# Patient Record
Sex: MALE | Race: BLACK OR AFRICAN AMERICAN | Employment: UNEMPLOYED | URBAN - METROPOLITAN AREA
[De-identification: names, ages, dates, MRNs, and addresses within clinical notes are randomized per-mention and may not be internally consistent; named-entity substitution may affect disease eponyms.]

---

## 2020-08-19 NOTE — PROGRESS NOTES
"[facility] Initial Evaluation  Subjective:  The history is provided by the patient. No  was used.   Therapist Generated HPI Evaluation  Problem details: Pt reports has had knee pain \"for a long time.\"  Wonders if is d/t a knee injury nearly 20 years ago while playing soccer that \"healed by itself.\"  Pt states did not really have formal evaluation or treatment at the time.  Referred to PT 08/19/2020.         Type of problem:  Right knee.    This is a new condition.  Condition occurred with:  Other reason.    Patient reports pain:  Posterior, lateral and anterior.        Exacerbated by: walking, squatting, bending and straightening the knee, stairs, carrying something heavy.  Relieved by: not moving the knee.          Patient Health History  Ace Marroquin being seen for right knee.       Problem occurred: when I was playing soccer   Pain is reported as 7/10 on pain scale.  General health as reported by patient is excellent.  Pertinent medical history includes: none.   Red flags:  None as reported by patient.  Medical allergies: none.   Surgeries include:  None.    Current medications:  None.    Current occupation is N/A.                                       Objective:  Standing Alignment:              Knee:  Normal      Gait:  Pt ambulates without device without gross asymmetry.                                                     Hip Evaluation  Hip PROM:  Hip PROM:  Left Hip:    Normal  Right Hip:  Normal                          Hip Strength:      Extension:  Left: 4/5  -  Pain:Right: 4/5    -  Pain:    Abduction:  Left: 4/5    -   Pain:Right: 4/5   -   Pain:                           Knee Evaluation:  ROM:    AROM    Hyperextension:  Left:  4    Right: 2 positive  Extension:  Left: 0    Right:  0  Flexion: Left: 144    Right: 132 positive  PROM        Flexion: Left:   Right:  135 empty end feel      Strength:     Extension:  Left: 5/5    Pain:-      Right: 4/5    " Pain:+  Flexion:  Left: 5/5    Pain:-      Right: 4/5    Pain:+    Quad Set Left:  Good    Pain: -   Quad Set Right:  Good    Pain: +  Ligament Testing:    Varus 0:  Left:  Neg   Right:  Neg  Varus 30:  Left:  Neg  Right:  Neg  Valgus 0:  Left:  Neg  Right:  Neg  Valgus 30:  Left:  Neg    Right:  Neg    Posterior Drawer: Left:  Neg  Right:  Neg  Lachman's:  Left:  Neg  Right:  Neg  Special Tests:     Left knee negative for the following special tests:  Rotary Instability-Anterior Medial; Rotary Instability-Anterior Lateral; Rotary Instability-Posterior Medial; Rotary Instability-Posterior Lateral; Patellar Tracking-Abduction Medial and Patellar Tracking-Abduction Lateral    Right knee negative for the following special tests:  Rotary Instability-Anterior Medial; Rotary Instability-Anterior Lateral; Rotary Instability-Posterior Medial; Rotary Instability-Posterior Lateral; Patellar Tracking-Abduction Medial and Patellar Tracking-Abduction Lateral  Palpation:  Normal      Edema:    Circumference:      Joint Line:  Left:  35.8 cm   Right:  36.3 cm    Mobility Testing:  Normal              Valgus deviation with SLS squat B.  Pt noted no change in symptoms on stairs after both repeated knee flexion and repeated knee extension.    General     ROS    Assessment/Plan:    Patient is a 45 year old male with right side knee complaints.    Patient has the following significant findings with corresponding treatment plan.                Diagnosis 1:  Chronic knee pain  Pain -  hot/cold therapy  Decreased ROM/flexibility - manual therapy and therapeutic exercise  Decreased strength - therapeutic exercise and therapeutic activities  Decreased function - therapeutic activities    Therapy Evaluation Codes:   1) History comprised of:   Personal factors that impact the plan of care:      Time since onset of symptoms.    Comorbidity factors that impact the plan of care are:      None.     Medications impacting care:  None.  2) Examination of Body Systems comprised of:   Body structures and functions that impact the plan of care:      Hip and Knee.   Activity limitations that impact the plan of care are:      Lifting, Squatting/kneeling, Stairs and Walking.  3) Clinical presentation characteristics are:   Stable/Uncomplicated.  4) Decision-Making    Low complexity using standardized patient assessment instrument and/or measureable assessment of functional outcome.  Cumulative Therapy Evaluation is: Low complexity.    Previous and current functional limitations:  (See Goal Flow Sheet for this information)    Short term and Long term goals: (See Goal Flow Sheet for this information)     Communication ability:  Patient appears to be able to clearly communicate and understand verbal and written communication and follow directions correctly.  Treatment Explanation - The following has been discussed with the patient:   RX ordered/plan of care  Anticipated outcomes  Possible risks and side effects  This patient would benefit from PT intervention to resume normal activities.   Rehab potential is good.    Frequency:  1 X week, once daily  Duration:  for 6 weeks  Discharge Plan:  Achieve all LTG.  Independent in home treatment program.  Reach maximal therapeutic benefit.    Please refer to the daily flowsheet for treatment today, total treatment time and time spent performing 1:1 timed codes.

## 2020-08-20 ENCOUNTER — THERAPY VISIT (OUTPATIENT)
Dept: PHYSICAL THERAPY | Facility: CLINIC | Age: 45
End: 2020-08-20
Payer: COMMERCIAL

## 2020-08-20 DIAGNOSIS — M25.561 CHRONIC PAIN OF RIGHT KNEE: ICD-10-CM

## 2020-08-20 DIAGNOSIS — G89.29 CHRONIC PAIN OF RIGHT KNEE: ICD-10-CM

## 2020-08-20 PROCEDURE — 97110 THERAPEUTIC EXERCISES: CPT | Mod: GP | Performed by: PHYSICAL THERAPIST

## 2020-08-20 PROCEDURE — 97161 PT EVAL LOW COMPLEX 20 MIN: CPT | Mod: GP | Performed by: PHYSICAL THERAPIST

## 2020-08-20 ASSESSMENT — ACTIVITIES OF DAILY LIVING (ADL)
GO UP STAIRS: ACTIVITY IS SOMEWHAT DIFFICULT
GO DOWN STAIRS: ACTIVITY IS MINIMALLY DIFFICULT
STAND: ACTIVITY IS NOT DIFFICULT
HOW_WOULD_YOU_RATE_THE_OVERALL_FUNCTION_OF_YOUR_KNEE_DURING_YOUR_USUAL_DAILY_ACTIVITIES?: NEARLY NORMAL
LIMPING: I DO NOT HAVE THE SYMPTOM
AS_A_RESULT_OF_YOUR_KNEE_INJURY,_HOW_WOULD_YOU_RATE_YOUR_CURRENT_LEVEL_OF_DAILY_ACTIVITY?: NEARLY NORMAL
SQUAT: ACTIVITY IS FAIRLY DIFFICULT
PAIN: I HAVE THE SYMPTOM BUT IT DOES NOT AFFECT MY ACTIVITY
RISE FROM A CHAIR: ACTIVITY IS SOMEWHAT DIFFICULT
SIT WITH YOUR KNEE BENT: ACTIVITY IS VERY DIFFICULT
WEAKNESS: I DO NOT HAVE THE SYMPTOM
KNEE_ACTIVITY_OF_DAILY_LIVING_SUM: 42
WALK: NOT ANSWERED
SWELLING: THE SYMPTOM AFFECTS MY ACTIVITY SLIGHTLY
KNEEL ON THE FRONT OF YOUR KNEE: ACTIVITY IS NOT DIFFICULT
STIFFNESS: NOT ANSWERED
GIVING WAY, BUCKLING OR SHIFTING OF KNEE: THE SYMPTOM AFFECTS MY ACTIVITY MODERATELY

## 2020-08-20 NOTE — LETTER
"[facility] [place] [facility] [place] [facility]  [address]  [address]  [place] [address]  [phone]    2020    Re: Ace Marroquin   :   1975  MRN:  [id number]   REFERRING PHYSICIAN:   Rubin CAMPBELLM [place] [facility]  Date of Initial Evaluation: 2020  Visits:  Rxs Used: 1  Reason for Referral:  Chronic pain of right knee    EVALUATION SUMMARY    [facility] Initial Evaluation  Subjective:  The history is provided by the patient. No  was used.   Therapist Generated HPI Evaluation  Problem details: Pt reports has had knee pain \"for a long time.\"  Wonders if is d/t a knee injury nearly 20 years ago while playing soccer that \"healed by itself.\"  Pt states did not really have formal evaluation or treatment at the time.  Referred to PT 2020.         Type of problem:  Right knee.    This is a new condition.  Condition occurred with:  Other reason.    Patient reports pain:  Posterior, lateral and anterior.    Exacerbated by: walking, squatting, bending and straightening the knee, stairs, carrying something heavy.  Relieved by: not moving the knee.        Patient Health History  Ace Marroquin being seen for right knee.     Problem occurred: when I was playing soccer   Pain is reported as 7/10 on pain scale.  General health as reported by patient is excellent.  Pertinent medical history includes: none.   Red flags:  None as reported by patient.  Medical allergies: none.   Surgeries include:  None.    Current medications:  None.    Current occupation is N/A.                     Re: Ace Marroquin   :   1975                  Objective:  Standing Alignment:    Knee:  Normal    Gait:  Pt ambulates without device without gross asymmetry.                     Hip Evaluation  Hip PROM:  Hip PROM:  Left Hip:    Normal  Right Hip:  Normal    Hip Strength:    Extension:  Left: 4/5  -  Pain:Right: 4/5    -  Pain:    Abduction:  Left: 4/5    -   Pain:Right: 4/5   -   " Pain:           Knee Evaluation:  ROM:    AROM  Hyperextension:  Left:  4    Right: 2 positive  Extension:  Left: 0    Right:  0  Flexion: Left: 144    Right: 132 positive    PROM  Flexion: Left:   Right:  135 empty end feel    Strength:   Extension:  Left: 5/5    Pain:-      Right: 4/5    Pain:+  Flexion:  Left: 5/5    Pain:-      Right: 4/5    Pain:+    Quad Set Left:  Good    Pain: -   Quad Set Right:  Good    Pain: +    Ligament Testing:    Varus 0:  Left:  Neg   Right:  Neg  Varus 30:  Left:  Neg  Right:  Neg  Valgus 0:  Left:  Neg  Right:  Neg  Valgus 30:  Left:  Neg    Right:  Neg  Posterior Drawer: Left:  Neg  Right:  Neg  Lachman's:  Left:  Neg  Right:  Neg    Special Tests:   Left knee negative for the following special tests:  Rotary Instability-Anterior Medial; Rotary Instability-Anterior Lateral; Rotary Instability-Posterior Medial; Rotary Instability-Posterior Lateral; Patellar Tracking-Abduction Medial and Patellar Tracking-Abduction Lateral  Right knee negative for the following special tests:  Rotary Instability-Anterior Medial; Rotary Instability-Anterior Lateral; Rotary Instability-Posterior Medial; Rotary  Re: Ace Marroquin   :   1975    Instability-Posterior Lateral; Patellar Tracking-Abduction Medial and Patellar Tracking-Abduction Lateral    Palpation:  Normal    Edema:    Circumference:  Joint Line:  Left:  35.8 cm   Right:  36.3 cm    Mobility Testing:  Normal  Valgus deviation with SLS squat B.  Pt noted no change in symptoms on stairs after both repeated knee flexion and repeated knee extension.        Assessment/Plan:    Patient is a 45 year old male with right side knee complaints.    Patient has the following significant findings with corresponding treatment plan.                Diagnosis 1:  Chronic knee pain  Pain -  hot/cold therapy  Decreased ROM/flexibility - manual therapy and therapeutic exercise  Decreased strength - therapeutic exercise and therapeutic  activities  Decreased function - therapeutic activities    Therapy Evaluation Codes:   1) History comprised of:   Personal factors that impact the plan of care:      Time since onset of symptoms.    Comorbidity factors that impact the plan of care are:      None.     Medications impacting care: None.  2) Examination of Body Systems comprised of:   Body structures and functions that impact the plan of care:      Hip and Knee.   Activity limitations that impact the plan of care are:      Lifting, Squatting/kneeling, Stairs and Walking.  3) Clinical presentation characteristics are:   Stable/Uncomplicated.  4) Decision-Making    Low complexity using standardized patient assessment instrument and/or measureable assessment of functional outcome.  Cumulative Therapy Evaluation is: Low complexity.    Previous and current functional limitations:  (See Goal Flow Sheet for this information)    Short term and Long term goals: (See Goal Flow Sheet for this information)     Communication ability:  Patient appears to be able to clearly communicate and understand verbal and written communication and follow directions correctly.  Treatment Explanation - The following has been discussed with the patient:   RX ordered/plan of care    Re: Ace Marroquin   :   1975    Anticipated outcomes  Possible risks and side effects  This patient would benefit from PT intervention to resume normal activities.   Rehab potential is good.    Frequency:  1 X week, once daily  Duration:  for 6 weeks  Discharge Plan:  Achieve all LTG.  Independent in home treatment program.  Reach maximal therapeutic benefit.      Thank you for your referral.    INQUIRIES  Therapist: Cl Pepper, PT, ATC, CSCS  [facility] [place]  [address]  [place] [address]  Phone: [phone]  Fax: [phone]

## 2020-09-03 ENCOUNTER — THERAPY VISIT (OUTPATIENT)
Dept: PHYSICAL THERAPY | Facility: CLINIC | Age: 45
End: 2020-09-03
Payer: COMMERCIAL

## 2020-09-03 DIAGNOSIS — G89.29 CHRONIC PAIN OF RIGHT KNEE: ICD-10-CM

## 2020-09-03 DIAGNOSIS — M25.561 CHRONIC PAIN OF RIGHT KNEE: ICD-10-CM

## 2020-09-03 PROCEDURE — 97110 THERAPEUTIC EXERCISES: CPT | Mod: GP | Performed by: PHYSICAL THERAPIST

## 2020-09-03 PROCEDURE — 97530 THERAPEUTIC ACTIVITIES: CPT | Mod: GP | Performed by: PHYSICAL THERAPIST

## 2020-09-03 NOTE — LETTER
[facility]  [address]  [address]  [place] [id number] [phone]    2020    Re: Ace Marroquin   :   1975  MRN:  [id number]   REFERRING PHYSICIAN:   Rubin [facility]    Date of Initial Evaluation: 20  Visits:  Rxs Used: 2  Reason for Referral:  Chronic pain of right knee    DISCHARGE REPORT    Progress reporting period is from 2020 to 10/06/2020.   Patient has failed to return to therapy so current subjective and objective findings are unknown.  The subjective and objective information are from the last visit (2020) with this patient.    SUBJECTIVE  Subjective: Pt reports HEP is not challenging or painful.   Current Pain level: 5/10   Initial Pain level: 7/10     OBJECTIVE  Objective: AROM 0-0-122 with discomfort at end range flexion.  Tender to palpation R lateral patellar facet.  Discomfort with resisted knee extension but not flexion.      ASSESSMENT/PLAN  Updated problem list and treatment plan: Diagnosis 1:  Knee pain -- home program  STG/LTGs have been met or progress has been made towards goals:  N/A  Assessment of Progress: The patient has not returned to therapy. Current status is unknown.  Self Management Plans:  Patient has been instructed in a home treatment program.  Ace continues to require the following intervention to meet STG and LTG's: PT intervention is no longer required to meet STG/LTG.      Recommendations:  Pt last seen in PT 2020.  He has since no showed without responding to phone messaeg left for him to check on status and no further PT is scheduled.  Discharge to [facility].      Thank you for your referral.    INQUIRIES  Therapist: Cl Pepper, PT, ATC, CSCS  [facility]  [address]  [address]  [place] [address]  Phone: [phone]  Fax: [phone]

## 2020-09-03 NOTE — PROGRESS NOTES
Subjective:  HPI  Physical Exam                    Objective:  System    Physical Exam    General     ROS    Assessment/Plan:    SUBJECTIVE  Subjective: Pt reports HEP is not challenging or painful.   Current Pain level: 5/10   Changes in function:  None     Adverse reaction to treatment or activity:  None    OBJECTIVE  Objective: AROM 0-0-122 with discomfort at end range flexion.  Tender to palpation R lateral patellar facet.  Discomfort with resisted knee extension but not flexion.     ASSESSMENT  Yisehak continues to require intervention to meet STG and LTG's: PT  No change of symptoms has been noted.  Response to therapy has shown lack of progress in  function  Progress made towards STG/LTG?  None    PLAN  Current treatment program is being advanced to more complex exercises.  Also curious re: response to taping.    PTA/ATC plan:  N/A    Please refer to the daily flowsheet for treatment today, total treatment time and time spent performing 1:1 timed codes.

## 2020-10-06 PROBLEM — M25.561 CHRONIC PAIN OF RIGHT KNEE: Status: RESOLVED | Noted: 2020-08-20 | Resolved: 2020-10-06

## 2020-10-06 PROBLEM — G89.29 CHRONIC PAIN OF RIGHT KNEE: Status: RESOLVED | Noted: 2020-08-20 | Resolved: 2020-10-06

## 2021-05-29 ENCOUNTER — RECORDS - HEALTHEAST (OUTPATIENT)
Dept: ADMINISTRATIVE | Facility: CLINIC | Age: 46
End: 2021-05-29